# Patient Record
Sex: FEMALE
[De-identification: names, ages, dates, MRNs, and addresses within clinical notes are randomized per-mention and may not be internally consistent; named-entity substitution may affect disease eponyms.]

---

## 2018-04-29 ENCOUNTER — HOSPITAL ENCOUNTER (OUTPATIENT)
Dept: HOSPITAL 68 - ERH | Age: 58
Setting detail: OBSERVATION
LOS: 2 days | End: 2018-05-01
Attending: SURGERY | Admitting: SURGERY
Payer: COMMERCIAL

## 2018-04-29 VITALS — BODY MASS INDEX: 35.14 KG/M2 | HEIGHT: 60 IN | WEIGHT: 179 LBS

## 2018-04-29 DIAGNOSIS — E66.9: ICD-10-CM

## 2018-04-29 DIAGNOSIS — K59.00: ICD-10-CM

## 2018-04-29 DIAGNOSIS — E03.9: ICD-10-CM

## 2018-04-29 DIAGNOSIS — K80.10: Primary | ICD-10-CM

## 2018-04-29 LAB
ABSOLUTE GRANULOCYTE CT: 9.1 /CUMM (ref 1.4–6.5)
BASOPHILS # BLD: 0 /CUMM (ref 0–0.2)
BASOPHILS NFR BLD: 0.3 % (ref 0–2)
EOSINOPHIL # BLD: 0.1 /CUMM (ref 0–0.7)
EOSINOPHIL NFR BLD: 0.8 % (ref 0–5)
ERYTHROCYTE [DISTWIDTH] IN BLOOD BY AUTOMATED COUNT: 13 % (ref 11.5–14.5)
GRANULOCYTES NFR BLD: 73.2 % (ref 42.2–75.2)
HCT VFR BLD CALC: 42.9 % (ref 37–47)
LYMPHOCYTES # BLD: 2.3 /CUMM (ref 1.2–3.4)
MCH RBC QN AUTO: 30.6 PG (ref 27–31)
MCHC RBC AUTO-ENTMCNC: 33.1 G/DL (ref 33–37)
MCV RBC AUTO: 92.6 FL (ref 81–99)
MONOCYTES # BLD: 0.9 /CUMM (ref 0.1–0.6)
PLATELET # BLD: 236 /CUMM (ref 130–400)
PMV BLD AUTO: 8.4 FL (ref 7.4–10.4)
RED BLOOD CELL CT: 4.63 /CUMM (ref 4.2–5.4)
WBC # BLD AUTO: 12.4 /CUMM (ref 4.8–10.8)

## 2018-04-29 PROCEDURE — G0378 HOSPITAL OBSERVATION PER HR: HCPCS

## 2018-04-29 NOTE — CT SCAN REPORT
EXAMINATION:
CT ABDOMEN AND PELVIS WITH CONTRAST
 
CLINICAL INFORMATION:
Generalized abdominal pain.
 
COMPARISON:
None available.
 
TECHNIQUE:
Multidetector volumetric imaging was performed of the abdomen and pelvis
following IV administration of 65 mL of Omnipaque 300 intravenous contrast.
Sagittal and coronal reformatted images were obtained on the technologist's
workstation.
 
DLP:
536.08 mGy-cm
 
FINDINGS:
 
LUNG BASES: Mild hypoventilatory changes of the partially visualized lower
lungs.
 
LIVER, GALLBLADDER, AND BILIARY TREE: The liver is normal in size, shape, and
attenuation. No focal hepatic lesion or biliary ductal dilatation is present.
The gallbladder is mildly distended. No radiopaque stones. No gallbladder
wall thickening. Minimal pericholecystic stranding of the adjacent fat.
Common bile duct is normal.
 
PANCREAS: Unremarkable.
 
SPLEEN: Unremarkable.
 
ADRENAL GLANDS: Unremarkable.
 
KIDNEYS AND URETERS: The kidneys are normal in size, shape, and attenuation.
No hydronephrosis, hydroureter, or calculi seen. No perinephric stranding.
There are 2, tiny hypodensities in the left kidney is too small to
characterize but most likely represents cysts.
 
BLADDER: Unremarkable.
 
GASTROINTESTINAL TRACT: The small and large bowel are unremarkable. The
appendix is unremarkable.
 
ABDOMINAL WALL: No significant hernia is appreciated.
 
LYMPH NODES: Normal.
 
VASCULAR: Unremarkable.
 
PELVIC VISCERA: Unremarkable.
 
OSSEOUS STRUCTURES: Unremarkable.
 
IMPRESSION:
Mildly distended gallbladder with possible minimal pericholecystic
inflammatory stranding of the adjacent fat. This apparent stranding could be
artifactual. Consider right upper quadrant ultrasound for further evaluation.
Alternatively, if there is concern for acute cholecystitis, HIDA scan could
also be obtained.
 
No radiopaque gallstones. Common bile duct is not dilated.
 
Otherwise unremarkable CT of the abdomen and pelvis.

## 2018-04-29 NOTE — ED GI/GU/ABDOMINAL COMPLAINT
History of Present Illness
 
General
Chief Complaint: Abdominal Pain/Flank Pain
Stated Complaint: "ABD PAIN"
Source: patient, family
Exam Limitations: language barrier
Allergies
Coded Allergies:
NO KNOWN ALLERGIES (10/22/15)
 
Triage Note:
PT FROM HOME C/O MID ABD PAIN SINCE 18 AT
 2130. PT STATES SHE DRANK A DETOX TEA AND SINCE
 THEN HAS HAD CONSTANT BURNING SENSATION IN HER MID
 ABD THAT IS NON RADIATING. PT STATES SHE HAS NOT
 HAD A BOWEL MOVEMENT IN THE PAST 24 HRS. 18
 PT HAD A COLONOSCOPY. PT STATES TAKING TUMS
 WITHOUT RELIEF.
Triage Nurses Notes Reviewed? yes
Pregnant? n
Is pt currently breastfeeding? No
Onset: Abrupt
Duration: day(s):
Timing: recent history
Quality/Severity: moderate
Location: generalized abdomen
Radiation: no radiation
Activities at Onset: none
HPI:
57-year-old female comes into the emergency room for further evaluation of 
generalized abdominal pain nausea and 1 episode of vomiting here in the 
emergency room.  Symptoms began yesterday around 9 PM it got progressively 
worse.  Denies any prior abdominal surgeries.  Last bowel movement yesterday.  
Denies any urinary symptoms.  Denies any chest pain or shortness of breath.  She
comes in for further evaluation.
(Umesh ROSSI,Balaji)
 
Vital Signs & Intake/Output
Vital Signs & Intake/Output
 Vital Signs
 
 
Date Time Temp Pulse Resp B/P B/P Pulse O2 O2 Flow FiO2
 
     Mean Ox Delivery Rate 
 
 0620 98.4 87 18 106/58  91 Room Air  
 
 
 ED Intake and Output
 
 
 / 0000 05/ 1200
 
Intake Total  1060
 
Output Total  1250
 
Balance  -190
 
   
 
Intake, IV  1000
 
Intake, Oral  60
 
Output, Urine  1250
 
 
 
(Beverly ROSSI,Cherrie Garcia)
Reconcile Medications
Calcium Gluconate 500 MG TAB   1 TAB PO BID HEALTH SUPPLEMENT
Liothyronine Sodium (Cytomel) 25 MCG TABLET   1 TAB PO DAILY thyroid  (Reported)
Oxycodone HCl/Acetaminophen (Percocet 5-325 MG Tablet) 5 MG-325 MG TABLET   1-2 
TAB PO Q4-6 PRN PAIN
 
(Francisca SHI,Lauri MCKAY)
 
Past History
 
Travel History
Traveled to [place] past 21 day No
 
Medical History
Any Pertinent Medical History? see below for history
Endocrine: hyperthyroidism
History of MRSA: No
History of VRE: No
History of CDIFF: No
 
Surgical History
Surgical History: hysterectomy
 
Psychosocial History
Who do you live with Patient/Self
What is your primary language Amharic
Tobacco Use: Never used
 
Family History
Hx Contributory? No
(Balaji Lay)
 
Review of Systems
 
Review of Systems
Constitutional:
Reports: no symptoms. 
EENTM:
Reports: no symptoms. 
Respiratory:
Reports: no symptoms. 
Cardiovascular:
Reports: no symptoms. 
GI:
Reports: see HPI. 
Genitourinary:
Reports: no symptoms. 
Musculoskeletal:
Reports: no symptoms. 
Skin:
Reports: no symptoms. 
Neurological/Psychological:
Reports: no symptoms. 
Hematologic/Endocrine:
Reports: no symptoms. 
Immunologic/Allergic:
Reports: no symptoms. 
All Other Systems: Reviewed and Negative
(Balaji Lay)
 
Physical Exam
 
Physical Exam
General Appearance: well developed/nourished, alert, awake
Head: atraumatic
Eyes:
Bilateral: normal appearance. 
Ears, Nose, Throat, Mouth: hearing grossly normal, moist mucous membrane
Neck: normal inspection
Respiratory: normal breath sounds, no respiratory distress
Cardiovascular: regular rate/rhythm
Gastrointestinal: soft, tenderness
Back: normal inspection
Extremities: normal range of motion
Neurologic/Psych: awake, alert, oriented x 3
Skin: intact, normal color
(Balaji Lay)
 
Core Measures
ACS in differential dx? No
Sepsis Present: No
Sepsis Focused Exam Completed? No
(Cherrie Stark)
 
Progress
Differential Diagnosis: AMI, appendicitis, biliary colic, bowel obstruction, 
cholecystitis, diverticulitis, gastritis, ischemic bowel, pancreatitis, PID/
cervicitis, PUD/GERD, perforated viscous, SBO, UTI/pyelo
Diagnostic Imaging:
Viewed by Me: CT Scan.  Discussed w/RAD: CT Scan. 
Initial ED EKG: normal sinus rhythm, rate (57)
Hand-Off
   Endorsed To:
Cherrie Stark
(Balaji Lay)
Plan of Care:
 Orders
 
 
Procedure Date/time Status
 
Wound Care/Dressing  0310 Active
 
Discharge Patient   UNK Active
 
OXYGEN SETUP CHG   UNK Complete
 
OXYGEN   UNK Complete
 
 
 Laboratory Tests
 
 
18 0650:
Anion Gap 12, Estimated GFR > 60, BUN/Creatinine Ratio 13.3, Total Bilirubin 0.5
, Direct Bilirubin 0.4, AST 99  H,   H, Alkaline Phosphatase 107, Total 
Protein 6.1  L, Albumin 3.3  L
 
Radiology Impression: PATIENT: PAULINE CAO  MEDICAL RECORD NO: [id number] 
PRESENT AGE: 57  PATIENT ACCOUNT NO: [id number] : 61  LOCATION: [facility] 
ORDERING PHYSICIAN: Balaji ROSSI     SERVICE DATE:  EXAM TYPE
: CAT - CT ABD & PELVIS W IV CONTRAST EXAMINATION: CT ABDOMEN AND PELVIS WITH 
CONTRAST CLINICAL INFORMATION: Generalized abdominal pain. COMPARISON: None 
available. TECHNIQUE: Multidetector volumetric imaging was performed of the 
abdomen and pelvis following IV administration of 65 mL of Omnipaque 300 
intravenous contrast. Sagittal and coronal reformatted images were obtained on 
the technologist's workstation. DLP: 536.08 mGy-cm FINDINGS: LUNG BASES: Mild 
hypoventilatory changes of the partially visualized lower lungs. LIVER, 
GALLBLADDER, AND BILIARY TREE: The liver is normal in size, shape, and 
attenuation. No focal hepatic lesion or biliary ductal dilatation is present. 
The gallbladder is mildly distended. No radiopaque stones. No gallbladder wall 
thickening. Minimal pericholecystic stranding of the adjacent fat. Common bile 
duct is normal. PANCREAS: Unremarkable. SPLEEN: Unremarkable. ADRENAL GLANDS: 
Unremarkable. KIDNEYS AND URETERS: The kidneys are normal in size, shape, and 
attenuation. No hydronephrosis, hydroureter, or calculi seen. No perinephric 
stranding. There are 2, tiny hypodensities in the left kidney is too small to 
characterize but most likely represents cysts. BLADDER: Unremarkable. 
GASTROINTESTINAL TRACT: The small and large bowel are unremarkable. The appendix
is unremarkable. ABDOMINAL WALL: No significant hernia is appreciated. LYMPH 
NODES: Normal. VASCULAR: Unremarkable. PELVIC VISCERA: Unremarkable. OSSEOUS 
STRUCTURES: Unremarkable. IMPRESSION: Mildly distended gallbladder with possible
minimal pericholecystic inflammatory stranding of the adjacent fat. This 
apparent stranding could be artifactual. Consider right upper quadrant 
ultrasound for further evaluation. Alternatively, if there is concern for acute 
cholecystitis, HIDA scan could also be obtained. No radiopaque gallstones. 
Common bile duct is not dilated. Otherwise unremarkable CT of the abdomen and 
pelvis. DICTATED BY: Paddy Mcclain MD  DATE/TIME DICTATED:18 
:RAD.BRAVO  DATE/TIME TRANSCRIBED:18 CONFIDENTIAL, 
DO NOT COPY WITHOUT APPROPRIATE AUTHORIZATION.  <Electronically signed in Other 
Vendor System>                                                                  
                     SIGNED BY: Paddy Mcclain MD 18
Comments:
CT scan shows mild distention of gallbladder with minimal surrounding fat 
stranding. Patient reports improvement in her pain. I examined the patient's 
abdomen, she has RUQ tenderness at this time. Lactic acid is elevated. Awaiting 
general surgery consult.
 
11:25 PM - spoke with Channing Reynoso MD regarding this patient.  Surgical PA 
to evaluate the patient.
 
11:55 PM - following evaluation by surgical PA patient to be surgical 
observation for gallbladder ultrasound tomorrow and futher surgical evlauation.
(Beverly ROSSI,Cherrie Garcia)
 
Departure
 
Departure
Condition: Stable
Referrals:
Rocío SHI,Rayna LEIGH (PCP/Family)
 
Departure Forms:
Customer Survey
General Discharge Information
(Balaji Lay)
 
Departure
Disposition: STILL A PATIENT
Clinical Impression
Primary Impression: Abdominal pain
Qualifiers:  Abdominal location: right upper quadrant Qualified Code: R10.11 - 
Right upper quadrant pain
Secondary Impressions: Gallbladder dilatation
 
Observation Note
Spoke With:
Angeles SHI,Channing CHEW
Physician Advisor Notified: MARY WALKER DO
Place Patient In: Non-ED OBS Care Area
Rationale for Observation:
My rational for observation is as follows [persistent abdominal pain with CAT 
scan showing gallbladder dilation and possible surrounding inflammation 
requiring gallbladder ultrasound, Gen. surgery consult, IV fluids, premature 
discharge medically unsafe].
 
(Beverly ROSSI,Cherrie Garcia)
 
Departure
Prescriptions:
Current Visit Scripts
Oxycodone HCl/Acetaminophen (Percocet 5-325 MG Tablet) 1-2 TAB PO Q4-6 PRN PAIN 
     #36 TAB 
 
 
 
PA/NP Co-Sign Statement
Statement:
ED Attending supervision documentation-
 
[] I saw and evaluated the patient. I have also reviewed all the pertinent lab 
results and diagnostic results. I agree with the findings and the plan of care 
as documented in the PA's/NP's documentation. 
 
[x] I have reviewed the ED Record and agree with the PA's/NP's documentation.
 
[] Additions or exceptions (if any) to the PAs/NP's note and plan are 
summarized below:
[]
 
(Francisca SHI,Lauri MCKAY)
 
 
(Morphine)     
 
Morphine Sulfate 4 MG Q4P PRN  0100 AC 
 
(MORPHINE SULFATE)     
 
Ondansetron HCl 4 MG Q6P PRN  0100 AC 
 
(Zofran)     
 
Acetaminophen 1,000 MG Q6P PRN  0030 AC 
 
(Ofirmev)     
 
N/A 1 UNIT    
 
(No Carrier)     
 
Heparin Sodium  5,000 UNIT Q8  0014 AC 
 
(Porcine)     1356
 
 
 Laboratory Tests
 
 
 
18 0800:
Anion Gap 10, Estimated GFR > 60, BUN/Creatinine Ratio 18.3, Total Bilirubin 0.4
, Direct Bilirubin 0.2, AST 32, ALT 54  H, Alkaline Phosphatase 92, Total 
Protein 5.7  L, Albumin 3.1  L, Lipase 55, PT 12.2, INR 1.12, APTT 27, CBC w 
Diff NO MAN DIFF REQ, RBC 3.78  L, MCV 92.9, MCH 32.0  H, MCHC 34.4, RDW 13.2, 
MPV 8.8, Gran % 63.6, Lymphocytes % 28.0, Monocytes % 6.8, Eosinophils % 1.3, 
Basophils % 0.3, Absolute Granulocytes 4.5, Absolute Lymphocytes 2.0, Absolute 
Monocytes 0.5, Absolute Eosinophils 0.1, Absolute Basophils 0
 
18 2330:
Lactic Acid 1.4
 
18 2245:
Urine Color YEL, Urine Clarity CLEAR, Urine pH 6.5, Ur Specific Gravity 1.015, 
Urine Protein NEG, Urine Ketones NEG, Urine Nitrite NEG, Urine Bilirubin NEG, 
Urine Urobilinogen 0.2, Ur Leukocyte Esterase NEG, Ur Microscopic SEDIMENT 
EXAMINED, Urine RBC RARE, Urine WBC 1-3  H, Ur Epithelial Cells FEW, Urine 
Bacteria RARE  H, Urine Mucus FEW, Urine Hemoglobin SMALL  H, Urine Glucose NEG
 
18:
Anion Gap 14, Estimated GFR > 60, BUN/Creatinine Ratio 16.7, Glucose 119  H, 
Lactic Acid 2.5  H, Calcium 9.5, Total Bilirubin 0.5, Direct Bilirubin 0.3, AST 
44  H, ALT 55  H, Alkaline Phosphatase 116, Troponin I < 0.01, Total Protein 7.5
, Albumin 4.4, Amylase 55, Lipase 89, CBC w Diff NO MAN DIFF REQ, RBC 4.63, MCV 
92.6, MCH 30.6, MCHC 33.1, RDW 13.0, MPV 8.4, Gran % 73.2, Lymphocytes % 18.4  L
, Monocytes % 7.3, Eosinophils % 0.8, Basophils % 0.3, Absolute Granulocytes 9.1
 H, Absolute Lymphocytes 2.3, Absolute Monocytes 0.9  H, Absolute Eosinophils 
0.1, Absolute Basophils 0
 
18:
Lactic Acid Cancelled
 
Radiology Impression: PATIENT: PAULINE CAO  MEDICAL RECORD NO: [id number] 
PRESENT AGE: 57  PATIENT ACCOUNT NO: [id number] : 62  LOCATION: [facility] 
ORDERING PHYSICIAN: Balaji ROSSI     SERVICE DATE:  EXAM TYPE
: CAT - CT ABD & PELVIS W IV CONTRAST EXAMINATION: CT ABDOMEN AND PELVIS WITH 
CONTRAST CLINICAL INFORMATION: Generalized abdominal pain. COMPARISON: None 
available. TECHNIQUE: Multidetector volumetric imaging was performed of the 
abdomen and pelvis following IV administration of 65 mL of Omnipaque 300 
intravenous contrast. Sagittal and coronal reformatted images were obtained on 
the technologist's workstation. DLP: 536.08 mGy-cm FINDINGS: LUNG BASES: Mild 
hypoventilatory changes of the partially visualized lower lungs. LIVER, 
GALLBLADDER, AND BILIARY TREE: The liver is normal in size, shape, and 
attenuation. No focal hepatic lesion or biliary ductal dilatation is present. 
The gallbladder is mildly distended. No radiopaque stones. No gallbladder wall 
thickening. Minimal pericholecystic stranding of the adjacent fat. Common bile 
duct is normal. PANCREAS: Unremarkable. SPLEEN: Unremarkable. ADRENAL GLANDS: 
Unremarkable. KIDNEYS AND URETERS: The kidneys are normal in size, shape, and 
attenuation. No hydronephrosis, hydroureter, or calculi seen. No perinephric 
stranding. There are 2, tiny hypodensities in the left kidney is too small to 
characterize but most likely represents cysts. BLADDER: Unremarkable. 
GASTROINTESTINAL TRACT: The small and large bowel are unremarkable. The appendix
is unremarkable. ABDOMINAL WALL: No significant hernia is appreciated. LYMPH 
NODES: Normal. VASCULAR: Unremarkable. PELVIC VISCERA: Unremarkable. OSSEOUS 
STRUCTURES: Unremarkable. IMPRESSION: Mildly distended gallbladder with possible
minimal pericholecystic inflammatory stranding of the adjacent fat. This 
apparent stranding could be artifactual. Consider right upper quadrant 
ultrasound for further evaluation. Alternatively, if there is concern for acute 
cholecystitis, HIDA scan could also be obtained. No radiopaque gallstones. 
Common bile duct is not dilated. Otherwise unremarkable CT of the abdomen and 
pelvis. DICTATED BY: Paddy Mcclain MD  DATE/TIME DICTATED:18 
:RAD.BRAVO  DATE/TIME TRANSCRIBED:18 CONFIDENTIAL, 
DO NOT COPY WITHOUT APPROPRIATE AUTHORIZATION.  <Electronically signed in Other 
Vendor System>                                                                  
                     SIGNED BY: Paddy Mcclain MD 18
Comments:
CT scan shows mild distention of gallbladder with minimal surrounding fat 
stranding. Patient reports improvement in her pain. I examined the patient's 
abdomen, she has RUQ tenderness at this time. Lactic acid is elevated. Awaiting 
general surgery consult.
 
11:25 PM - spoke with Channing Reynoso MD regarding this patient.  Surgical PA 
to evaluate the patient.
 
11:55 PM - following evaluation by surgical PA patient to be surgical 
observation for gallbladder ultrasound tomorrow and futher surgical evlauation.
(Beverly ROSSI,Cherrie Garcia)
 
Departure
 
Departure
Condition: Stable
Referrals:
Rocío SHI,Rayna LEIGH (PCP/Family)
 
Departure Forms:
Customer Survey
General Discharge Information
(Balaji Lay)
 
Departure
Disposition: STILL A PATIENT
Clinical Impression
Primary Impression: Abdominal pain
Qualifiers:  Abdominal location: right upper quadrant Qualified Code: R10.11 - 
Right upper quadrant pain
Secondary Impressions: Gallbladder dilatation
 
Observation Note
Spoke With:
Angeles SHI,Channing CHEW
Physician Advisor Notified: MARY WALKER DO
Place Patient In: Non-ED OBS Care Area
Rationale for Observation:
My rational for observation is as follows [persistent abdominal pain with CAT 
scan showing gallbladder dilation and possible surrounding inflammation 
requiring gallbladder ultrasound, Gen. surgery consult, IV fluids, premature 
discharge medically unsafe].
 
(Cherrie Stark)

## 2018-04-30 VITALS — SYSTOLIC BLOOD PRESSURE: 98 MMHG | DIASTOLIC BLOOD PRESSURE: 54 MMHG

## 2018-04-30 VITALS — DIASTOLIC BLOOD PRESSURE: 62 MMHG | SYSTOLIC BLOOD PRESSURE: 102 MMHG

## 2018-04-30 VITALS — SYSTOLIC BLOOD PRESSURE: 102 MMHG | DIASTOLIC BLOOD PRESSURE: 60 MMHG

## 2018-04-30 VITALS — SYSTOLIC BLOOD PRESSURE: 126 MMHG | DIASTOLIC BLOOD PRESSURE: 80 MMHG

## 2018-04-30 LAB
ABSOLUTE GRANULOCYTE CT: 4.5 /CUMM (ref 1.4–6.5)
APTT BLD: 27 SEC (ref 25–37)
BASOPHILS # BLD: 0 /CUMM (ref 0–0.2)
BASOPHILS NFR BLD: 0.3 % (ref 0–2)
EOSINOPHIL # BLD: 0.1 /CUMM (ref 0–0.7)
EOSINOPHIL NFR BLD: 1.3 % (ref 0–5)
ERYTHROCYTE [DISTWIDTH] IN BLOOD BY AUTOMATED COUNT: 13.2 % (ref 11.5–14.5)
GRANULOCYTES NFR BLD: 63.6 % (ref 42.2–75.2)
HCT VFR BLD CALC: 35.1 % (ref 37–47)
LYMPHOCYTES # BLD: 2 /CUMM (ref 1.2–3.4)
MCH RBC QN AUTO: 32 PG (ref 27–31)
MCHC RBC AUTO-ENTMCNC: 34.4 G/DL (ref 33–37)
MCV RBC AUTO: 92.9 FL (ref 81–99)
MONOCYTES # BLD: 0.5 /CUMM (ref 0.1–0.6)
PLATELET # BLD: 172 /CUMM (ref 130–400)
PMV BLD AUTO: 8.8 FL (ref 7.4–10.4)
PROTHROMBIN TIME: 12.2 SEC (ref 9.4–12.5)
RED BLOOD CELL CT: 3.78 /CUMM (ref 4.2–5.4)
WBC # BLD AUTO: 7.2 /CUMM (ref 4.8–10.8)

## 2018-04-30 NOTE — OPERATIVE REPORT
Operative/Inv Procedure Report
Surgery Date: 04/30/18
Name of Procedure:
Laparoscopic cholecystectomy
Pre-Operative Diagnosis:
Acute cholecystitis
Post-Operative Diagnosis:
same
Estimated Blood Loss: scant
Surgeon/Assistant:
MD Deejay Shepard MD 
 
Anesthesia: general endotracheal tube
 
Operative/Procedure Note
Note:
Patient was positioned supine. After successful induction of general anesthesia,
the patient's abdomen was clipped, prepped and draped in the usual sterile 
fashion. Local anesthetic was injected at the top of the umbilicus and then a 
curved horizontal incision little over a centimeter was made there with a 15 
blade and then deepened to the midline fascia which was incised vertically a 
little over a centimeter. Both sides were secured with 0 Vicryl stay sutures and
then the thin peritoneal layer was entered, 10 mm Kern trocar inserted 
obliquely to the right, and the gas was turned on to 15 mm. After insufflation 
and repositioning to reverse Trendelenburg, 3 more dissecting 5 mm trochars were
placed in the right subcostal area, first lateral, then mid-subcostal, then 
subxiphoid. The gallbladder was inflamed acutely and the omentum was completely 
covering it and adherent it took some time to uncover 1 omental vessel needed a 
clip to control it then fundus was grasped from the lateral port and retracted 
up over the edge of the liver and then we dissected out the area of the triangle
of Calot while retracting the infundibulum caudally / laterally.  First the 
cystic duct was identified, isolated at the neck, clipped 3 times, divided after
the second clip and then in similar fashion the cystic artery was identified 
medially, dissected and divided. Then the gallbladder was  from the 
liver bed using cautery, this case there were more vessels to the gallbladder 
and the liver bed, once  it was then lowered into an Endobag and 
removed through the umbilical incision.  The instruments and then the trochars 
were removed letting the gas escape.  The fascial incision was closed with a 
figure 8 Vicryl then all 4 skin incisions were closed with interrupted 
subcuticular 4-0 Monocryl, followed by Mastisol Steri-Strips and Bandaids.  
Estimated blood loss was minimal, lap and sponge counts were correct, wound 
expectancy was clean-contaminated, IV fluids crystalloid, complications none, 
patient tolerated the procedure well and was returned to the recovery room in 
satisfactory condition.

## 2018-04-30 NOTE — PN- GENERAL SURGERY
Subjective
Subjective:
Post op check
Awake, alert
Tolerating regular diet
Pain is well controlled at this time
 
Objective
Vital Signs and I&Os
Vital Signs
 
 
Date Time Temp Pulse Resp B/P B/P Pulse O2 O2 Flow FiO2
 
     Mean Ox Delivery Rate 
 
04/30 1352 98.1 67 20 126/80  95 Room Air  
 
04/30 0601 98.0 66 20 102/62  93 Room Air  
 
04/30 0210 97.9 64 20 98/54  93 Room Air  
 
04/30 0137 98.8 64 18 148/70  99   
 
04/29 2238 98.7 68 18 132/58  96 Room Air  
 
 
 Intake & Output
 
 
 04/30 1600 04/30 0800 04/30 0000 04/29 1600 04/29 0800 04/29 0000
 
Intake Total 2030 2850    
 
Output Total      
 
Balance 2030 2850    
 
       
 
Intake, IV 2000 2850    
 
Intake, Oral 30     
 
Patient  179 lb 180 lb   
 
Weight      
 
Weight   Reported by Patient   
 
Measurement      
 
Method      
 
 
 
Physical Exam:
vss, afebrile
 
General: alert and oriented times three
Chest; clear anteriorly bilaterally, RRR
Abd: soft, good bs
Ext: warm, no edema
Wd: dressed, dry
 
Assessment/Plan
Assessment/Plan
56yo female s/p lap stephen
 
regular diet
pain management
dc planning
 
 
Core Measures
 
Venous Thromboembolism
VTE Risk Factors Surgery
No Mechanical VTE Prophylaxis d/t N/A MechProphylax Ordered
No VTE Pharm Prophylaxis d/t NA PharmProphylax ordered

## 2018-04-30 NOTE — ULTRASOUND REPORT
EXAMINATION:
US ABDOMEN LIMITED
 
CLINICAL INFORMATION:
Right upper quadrant pain. Presumptive diagnosis of cholecystitis.
 
COMPARISON:
CT scan of the abdomen and pelvis dated 04/29/2018.
 
TECHNIQUE:
Real-time imaging of the right upper quadrant abdominal viscera.
 
FINDINGS:
 
PANCREAS: Only small portions of the pancreatic body are visualized and
appear unremarkable. Remainder of the pancreas is obscured by overlying bowel
gas.
 
LIVER: The liver demonstrates normal size and contour. There is diffuse
increased in hepatic parenchymal echogenicity, limiting sound beam
penetration, consistent with diffuse infiltrative disease, most likely
extensive hepatic steatosis. No focal lesion or intrahepatic biliary duct
dilatation.
 
GALLBLADDER: There is a 2.0 x 1.1 x 2.2 cm densely calcified shadowing
gallstone seen lodged within the gallbladder neck. Mild thickening and edema
of the gallbladder wall is seen with the gallbladder wall measuring up to 0.6
cm in thickness. Small amount of pericholecystic fluid is also seen. No
sonographic Whitley sign is elicited while scanning over the gallbladder.
 
COMMON BILE DUCT: Normal in caliber measuring 0.4 cm in diameter.
 
RIGHT KIDNEY: Normal. No hydronephrosis. No renal calculi or focal
parenchymal lesions. The kidney measures 11.5 cm in maximum dimension.
 
FREE FLUID: None.
 
IMPRESSION:
 
1. Abnormal appearance of the gallbladder with gallbladder wall thickening
and pericholecystic edema seen. Findings are nonspecific in the setting of
diffuse hepatic parenchymal disease and may simply reflect secondary findings
of hepatic dysfunction. However, acute cholecystitis cannot be excluded.
There is a gallstone seen dependently lodged within the gallbladder neck.
Close clinical correlation is requested. HIDA scan, as suggested previously,
may be helpful in further clarification of imaging findings.
2. Diffusely increased hepatic parenchymal echogenicity, consistent with
diffuse infiltrative disease, most likely extensive hepatic steatosis. Close
clinical correlation requested.
3. Incomplete view of the pancreas. Only small portions of the pancreatic
body are seen and are unremarkable.

## 2018-04-30 NOTE — CONS- GENERAL SURGERY
General Information and HPI
 
Consulting Request
Date of Consult: 04/30/18
Requested By:
Dr. Christine
 
Reason for Consult:
abdominal pain
Source of Information: patient, family
Exam Limitations: no limitations
History of Present Illness:
A 56 y/o female arrives to the ER complaining of epigastric pain and right upper
quadrant pain that is been persistent for more than 24 hours.  Patient states 24
-hour ago the pain started after she drank some herbal tea she started to feel 
some discomfort in the epigastrium and that radiated to the right upper quadrant
and into her back.  She associated nausea and 1 episode of vomiting.  The pain 
has become progressively worse despite conservative measures at home.  She 
decided to come to the emergency room for further evaluation.  While in the ER 
her vital signs have remained stable she is been given IV Tylenol which helped 
initially however the pain has returned.  She denies any fevers or chills no 
chest pain no shortness of breath no cough or congestion previous abdominal 
surgeries she does have some constipation
 
Allergies/Medications
Allergies:
Coded Allergies:
NO KNOWN ALLERGIES (10/22/15)
 
Home Med List:
Calcium Gluconate 500 MG TAB   1 TAB PO BID HEALTH SUPPLEMENT
Liothyronine (Cytomel 25 Mcg) 25 MCG TAB   1 TAB PO BID THYROID HEALTH
OXYCODONE HCL/ACETAMINOPHEN (Percocet 5-325 MG Tablet) 325 MG/5 MG TAB   1-2 TAB
PO Q4P PRN PAIN SCALE 1-4
 
Current Medications:
 Current Medications
 
 
  Sig/Lorie Start time  Last
 
Medication Dose Route Stop Time Status Admin
 
Acetaminophen 1,000 MG Q6P PRN 04/30 0030 UNVr 
 
N/A 1 UNIT IV   
 
Acetaminophen 0 .STK-MED ONE 04/29 2026 DC 
 
  IV   
 
Acetaminophen 1,000 MG ONCE ONE 04/29 2015 DC 04/29
 
N/A 1 UNIT IV 04/29 2029 2037
 
Ampicillin Sodium/ 3,000 MG Q6 04/30 0100 UNVr 
 
Sulbactam Sodium  IV   
 
Sodium Chloride 100 ML    
 
Dextrose/Sodium  1,000 ML Q10H 04/30 0100 UNVr 
 
Chloride  IV   
 
Docusate Sodium 100 MG DAILY PRN 04/30 0100 UNVr 
 
  PO   
 
Heparin Sodium  5,000 UNIT Q8 04/30 0014 UNVr 
 
(Porcine)  SC   
 
Morphine Sulfate 2 MG Q4P PRN 04/30 0100 UNVr 
 
  IV   
 
Morphine Sulfate 4 MG Q4P PRN 04/30 0100 UNVr 
 
  IV   
 
Ondansetron HCl 4 MG Q6P PRN 04/30 0100 UNVr 
 
  IV   
 
Ondansetron HCl 0 .STK-MED ONE 04/29 2026 DC 
 
  .ROUTE   
 
Ondansetron HCl 4 MG ONCE ONE 04/29 2015 DC 04/29
 
  IV 04/29 2016 2037
 
Sodium Chloride 1,000 ML BOLUS ONE 04/29 2245 DC 04/29
 
  IV 04/29 2344  2250
 
Sodium Chloride 1,000 ML BOLUS ONE 04/29 2015 DC 04/29
 
  IV 04/29 2114 2037
 
 
 
 
Past History
 
Medical History
Blood Transfusion Hx: No
EENT: NONE (thyroidectomy)
Cardiovascular: NONE
Respiratory: NONE
Gastrointestinal: NONE
Hepatic: NONE
Endocrine: hyperthyroidism
 
Surgical History
Pertinent Surgical History: none (thyroidectomy), hysterectomy
 
Psychosocial History
Where Do You Live? Home
Who Do You Live With? spouse
Primary Language: English
Smoking Status: Never Smoked
ETOH Use: occasional use
Illicit Drug Use: denies illicit drug use
 
Functional Ability
Ambulation: independent
 
Employment History
Retired? no
 
Review of Systems
Review of Systems:
Constitutional: No chills no fever no weakness
HEENT: No blurred vision visual changes no throat pain nasal pain
CV: denies chest pain edema orthopnea syncopal episode no peripheral edema
Respiratory: No cough hemoptysis shortness of breath or wheeze
GI: pos abdominal pain no bloating pos constipation no diarrhea or bloody stools
pos vomiting
Musculoskeletal: No neck pain back pain or joint swelling
Skin: No recent acute changes in skin
Neurological: No dizziness weakness  or acute mental status changes
Hematologic: no history of blood dyscrasia to include PE DVT or bleeding 
disorder
No reported reaction to general anesthetics
No recent fevers fluids or infections
 
Review of Systems
Constitutional:
Denies: chills, weakness. 
GI:
Reports: abdominal pain, constipation, nausea, vomiting. 
 
Exam & Diagnostic Data
Vital Signs and I&O
Vital Signs
 
 
Date Time Temp Pulse Resp B/P B/P Pulse O2 O2 Flow FiO2
 
     Mean Ox Delivery Rate 
 
04/29 2238 98.7 68 18 132/58  96 Room Air  
 
04/29 1956 98.9 63 18 160/79  98 Room Air  
 
 
 Intake & Output
 
 
 04/30 0800 04/30 0000 04/29 1600 04/29 0800 04/29 0000 04/28 1600
 
Intake Total      
 
Output Total      
 
Balance      
 
       
 
Patient  180 lb    
 
Weight      
 
Weight  Reported by Patient    
 
Measurement      
 
Method      
 
 
 
Physical Exam:
Patient is alert and oriented 3 lying on the stretcher and appears comfortable.
 
HEENT is within normal limits
Neck is supple nontender with active range of motion
Chest clear to auscultation symmetric without rales rhonchi or wheeze
Heart is regular rate rhythm without murmurs rubs gallops
Abdomen is rounded and obese with mild distention tender right upper quadrant 
with positive Whitley sign of bowel sounds throughout
Lower extremities no edema no calf tenderness distal pulses intact
 
CT scan  -LIVER, GALLBLADDER, AND BILIARY TREE: The liver is normal in size, 
shape, and
attenuation. No focal hepatic lesion or biliary ductal dilatation is present.
The gallbladder is mildly distended. No radiopaque stones. No gallbladder
wall thickening. Minimal pericholecystic stranding of the adjacent fat.
Common bile duct is normal.
 
 
Admission Lab Results
I reviewed the following labs:
 Laboratory Tests
 
 
 04/29 04/29
 
 2330 2245
 
Chemistry  
 
  Lactic Acid (0.7 - 2.1 mmol/L) 1.4 
 
Urines  
 
  Urine Color (YEL,AMB,STR)  YEL
 
  Urine Clarity (CLEAR)  CLEAR
 
  Urine pH (5.0 - 8.0)  6.5
 
  Ur Specific Gravity (1.001 - 1.035)  1.015
 
  Urine Protein (NEG,<30 MG/DL)  NEG
 
  Urine Ketones (NEG)  NEG
 
  Urine Nitrite (NEG)  NEG
 
  Urine Bilirubin (NEG)  NEG
 
  Urine Urobilinogen (0.1  -  1.0 EU/dl)  0.2
 
  Ur Leukocyte Esterase (NEG)  NEG
 
  Ur Microscopic  SEDIMENT EXAMINED
 
  Urine RBC (0 - 5 /HPF)  RARE
 
  Urine WBC (0 - 2 /HPF)  1-3  H
 
  Ur Epithelial Cells (NONE,FEW)  FEW
 
  Urine Bacteria (NEG/NONE)  RARE  H
 
  Urine Mucus (FEW,NONE)  FEW
 
  Urine Hemoglobin (NEG)  SMALL  H
 
  Urine Glucose (N MG/DL)  NEG
 
 
 
 
 04/29 04/29 2035 2026
 
Chemistry  
 
  Sodium (137 - 145 mmol/L) 146  H 
 
  Potassium (3.5 - 5.1 mmol/L) 3.9 
 
  Chloride (98 - 107 mmol/L) 102 
 
  Carbon Dioxide (22 - 30 mmol/L) 30 
 
  Anion Gap (5 - 16) 14 
 
  BUN (7 - 17 mg/dL) 15 
 
  Creatinine (0.5 - 1.0 mg/dL) 0.9 
 
  Estimated GFR (>60 ml/min) > 60 
 
  BUN/Creatinine Ratio (7 - 25 %) 16.7 
 
  Glucose (65 - 99 mg/dL) 119  H 
 
  Lactic Acid (0.7 - 2.1 mmol/L) 2.5  H Cancelled
 
  Calcium (8.4 - 10.2 mg/dL) 9.5 
 
  Total Bilirubin (0.2 - 1.3 mg/dL) 0.5 
 
  Direct Bilirubin (< 0.4 mg/dL) 0.3 
 
  AST (14 - 36 U/L) 44  H 
 
  ALT (9 - 52 U/L) 55  H 
 
  Alkaline Phosphatase (<127 U/L) 116 
 
  Troponin I (< 0.11 ng/ml) < 0.01 
 
  Total Protein (6.3 - 8.2 g/dL) 7.5 
 
  Albumin (3.5 - 5.0 g/dL) 4.4 
 
  Amylase (30 - 110 U/L) 55 
 
  Lipase (23 - 300 U/L) 89 
 
Hematology  
 
  CBC w Diff NO MAN DIFF REQ 
 
  WBC (4.8 - 10.8 /CUMM) 12.4  H 
 
  RBC (4.20 - 5.40 /CUMM) 4.63 
 
  Hgb (12.0 - 16.0 G/DL) 14.2 
 
  Hct (37 - 47 %) 42.9 
 
  MCV (81.0 - 99.0 FL) 92.6 
 
  MCH (27.0 - 31.0 PG) 30.6 
 
  MCHC (33.0 - 37.0 G/DL) 33.1 
 
  RDW (11.5 - 14.5 %) 13.0 
 
  Plt Count (130 - 400 /CUMM) 236 
 
  MPV (7.4 - 10.4 FL) 8.4 
 
  Gran % (42.2 - 75.2 %) 73.2 
 
  Lymphocytes % (20.5 - 51.1 %) 18.4  L 
 
  Monocytes % (1.7 - 9.3 %) 7.3 
 
  Eosinophils % (0 - 5 %) 0.8 
 
  Basophils % (0.0 - 2.0 %) 0.3 
 
  Absolute Granulocytes (1.4 - 6.5 /CUMM) 9.1  H 
 
  Absolute Lymphocytes (1.2 - 3.4 /CUMM) 2.3 
 
  Absolute Monocytes (0.10 - 0.60 /CUMM) 0.9  H 
 
  Absolute Eosinophils (0.0 - 0.7 /CUMM) 0.1 
 
  Absolute Basophils (0.0 - 0.2 /CUMM) 0 
 
 
 
 
Assessment/Plan
Assessment/Plan
57-year-old female arrived to the emergency room complaining of right upper 
quadrant pain CAT scan performed in the ER shows inflammation of the gallbladder
with surrounding fluid.  despite given IV Tylenol the pain is persistent.  DVC 
count and lactic acid are slightly elevated enzymes are also slightly elevated
And she will be admitted to the surgical service started on IV fluids and Unasyn
and be nothing by mouth.
Morning right upper quadrant ultrasound
DVT prophylaxis will be Alps and heparin subcutaneous
 
 
 
Consult Acknowledgment
- Thank you for your consult request.

## 2018-04-30 NOTE — HISTORY & PHYSICAL PRE-OP
General Information and HPI
Source of Information: patient, family
Exam Limitations: no limitations
History of Present Illness:
CC: abdominal pain
HPI: 58 yo non-diabetic non-smoker with a history of hypothyroidism (
thyroidectomy), and constipation, little over 24 hours ago had a special tea her
mother-in-law made for and then severe abdominal pain which is a little better 
now in the ER . Earlier she recalls she had leftover takeout Chinese. Pain was 
RUQ / epigastric did not radiate, not worse with activity, some nausea initially
, no similar prior episodes no fevers no particular darkening of urine (ie iced 
tea) or lightening / loose stools (grey), no FHx of gallbladder problems. 
Otherwise no changes bowel habits, weight or appetite. I've reviewed the [facility]. 
No history of GERD, PUD, bleeding problems, heart disease or issues with 
anesthesia.  Family history negative for colorectal cancer
 
Allergies/Medications
Allergies:
Coded Allergies:
NO KNOWN ALLERGIES (10/22/15)
 
Home Med list
Calcium Gluconate 500 MG TAB   1 TAB PO BID HEALTH SUPPLEMENT
Liothyronine Sodium (Cytomel) 25 MCG TABLET   1 TAB PO DAILY thyroid  (Reported)
OXYCODONE HCL/ACETAMINOPHEN (Percocet 5-325 MG Tablet) 325 MG/5 MG TAB   1-2 TAB
PO Q4P PRN PAIN SCALE 1-4
 
 
Past History
 
Medical History
Blood Transfusion Hx: No
Neurological: NONE
EENT: NONE (thyroidectomy)
Cardiovascular: NONE
Respiratory: NONE
Gastrointestinal: NONE
Hepatic: NONE
Renal: NONE
Musculoskeletal: NONE
Psychiatric: NONE
Endocrine: hyperthyroidism
Blood Disorders: NONE
Cancer(s): NONE
GYN/Reproductive: fibroid
History of MRSA: No
History of VRE: No
History of CDIFF: No
 
Surgical History
Pertinent Surgical History: hysterectomy, N (thyroidectomy)
 
Past Family/Social History
 
Psychosocial History
Where Do You Live? Home
Who Do You Live With? spouse
Primary Language: English
Smoking Status: Never Smoked
ETOH Use: occasional use
Illicit Drug Use: denies illicit drug use
 
Functional Ability
Ambulation: independent
 
Review of Systems
Review of Systems:
Constitutional: No fever, sweats or weight loss
ENMT: No sore throat 
Cardiovascular: No chest pain, palpitations or leg swelling 
Respiratory: No shortness of breath, cough, or sputum or dyspnea on exertion
GI: No GERD or bleeding per rectum
: No dysuria or hematuria
Musculoskeletal: No new muscle weakness, bone or joint pain
Skin / Breast: No jaundice, rashes or itching
Psychiatric: No history of drug or alcohol abuse no depression or anxiety
Hematologic / lymphatic system: No problems with excessive bleeding, bruising, 
or blood clots
 
Exam & Diagnostic Data
Last 24 Hrs of Vital Signs/I&O
I reviewed Vital Signs
 
 
Date Time Temp Pulse Resp B/P B/P Pulse O2 O2 Flow FiO2
 
     Mean Ox Delivery Rate 
 
04/30 0601 98.0 66 20 102/62  93 Room Air  
 
04/30 0210 97.9 64 20 98/54  93 Room Air  
 
04/30 0137 98.8 64 18 148/70  99   
 
04/29 2238 98.7 68 18 132/58  96 Room Air  
 
04/29 1956 98.9 63 18 160/79  98 Room Air  
 
 
I reviewed Intake & Output
 
 
 04/30 1600 04/30 0800 04/30 0000
 
Intake Total  2850 
 
Output Total   
 
Balance  2850 
 
    
 
Intake, IV  2850 
 
Patient  179 lb 180 lb
 
Weight   
 
Weight   Reported by Patient
 
Measurement   
 
Method   
 
 
 
Physical Exam:
Constitutional: pleasant, no acute distress, conversant
Eyes: sclera anicteric 
ENMT: ears and nose atraumatic, moist mucous membranes, good dentition, no lip 
lesions
Neck: Supple, trachea is midline, no cervical or supraclavicular adenopathy and 
no palpable thyromegaly 
Cardiovascular: S1, S2, no murmurs, no peripheral edema 
Respiratory: clear to auscultation with normal respiratory effort and no 
intercostal retractions 
GI: abdomen soft, right upper quadrant tenderness says less than yesterday, 
nondistended, no palpable hepatosplenomegaly
Extremities / lymphatics: symmetrically warm, free range of motion no peripheral
edema, no cervical, supraclavicular, axillary, or inguinal adenopathy
Musculoskeletal: Did not evaluate gait and station, no digital cyanosis, good 
muscle strength and tone no atrophy, motor grossly 5 out of 5 throughout
Skin: no jaundice, no rashes warm, nondiaphoretic, no areas of erythema or 
induration
Psychiatric: mood and affect are appropriate and alert and oriented to person 
place and time
Last 24 Hrs of Labs/Ariel:
I reviewed Laboratory Tests
 
04/30/18 0800:
Anion Gap 10, Estimated GFR > 60, BUN/Creatinine Ratio 18.3, Total Bilirubin 0.4
, Direct Bilirubin 0.2, AST 32, ALT 54  H, Alkaline Phosphatase 92, Total 
Protein 5.7  L, Albumin 3.1  L, Lipase 55, PT 12.2, INR 1.12, APTT 27, CBC w 
Diff Pending, WBC Pending, RBC Pending, Hgb Pending, Hct Pending, MCV Pending, 
MCH Pending, MCHC Pending, RDW Pending, Plt Count Pending, MPV Pending
 
04/29/18 2330:
Lactic Acid 1.4
 
04/29/18 2245:
Urine Color YEL, Urine Clarity CLEAR, Urine pH 6.5, Ur Specific Gravity 1.015, 
Urine Protein NEG, Urine Ketones NEG, Urine Nitrite NEG, Urine Bilirubin NEG, 
Urine Urobilinogen 0.2, Ur Leukocyte Esterase NEG, Ur Microscopic SEDIMENT 
EXAMINED, Urine RBC RARE, Urine WBC 1-3  H, Ur Epithelial Cells FEW, Urine 
Bacteria RARE  H, Urine Mucus FEW, Urine Hemoglobin SMALL  H, Urine Glucose NEG
 
04/29/18 2035:
Anion Gap 14, Estimated GFR > 60, BUN/Creatinine Ratio 16.7, Glucose 119  H, 
Lactic Acid 2.5  H, Calcium 9.5, Total Bilirubin 0.5, Direct Bilirubin 0.3, AST 
44  H, ALT 55  H, Alkaline Phosphatase 116, Troponin I < 0.01, Total Protein 7.5
, Albumin 4.4, Amylase 55, Lipase 89, CBC w Diff NO MAN DIFF REQ, RBC 4.63, MCV 
92.6, MCH 30.6, MCHC 33.1, RDW 13.0, MPV 8.4, Gran % 73.2, Lymphocytes % 18.4  L
, Monocytes % 7.3, Eosinophils % 0.8, Basophils % 0.3, Absolute Granulocytes 9.1
 H, Absolute Lymphocytes 2.3, Absolute Monocytes 0.9  H, Absolute Eosinophils 
0.1, Absolute Basophils 0
 
04/29/18 2026:
Lactic Acid Cancelled
 
 
Assessment/Plan
Assessment/Plan:
Studies I reviewed yesterday's CT scan on PACS myself that shows a dilated 
slightly inflamed gallbladder, ultrasound today shows one large stone wall 
thickening pericholecystic fluid but a negative Whitley's
 
My impression is symptomatic gallstones.  The story is somewhat typical, though 
she feels better the imaging shows acute cholecystitis, so I recommend 
cholecystectomy.  The current standard of care is removal of the gallbladder 
laparoscopically, preferably not emergently.  Once they become symptomatic, 
gallstones can lead to complications such as cholecystitis, pancreatitis and 
cholangitis and rarely others. More workup is not needed but we will get a 
preoperative labs including LFTs.  I explained the nature and possibility of 
retained stones, and rarely, persistent postoperative diarrhea.  I drew a 
diagram illustrating how the stones cause problems and how the anatomy and 
inflammation can make the surgery more difficult, sometimes requiring an open 
procedure, and rarely to repair a bile duct injury leading to significant 
morbidity and even mortality.  This in our practice is exceedingly rare, but 
other more common risks were also discussed such as infection, injury to other 
surrounding structures such as bowel and blood vessels.  We also discussed the 
potential risks, benefits and alternatives to the procedure and surgery in 
general, issues that included but were not limited to, anesthetic risks 
hemorrhage requiring transfusion, the risk of transfusion itself, infection, 
heart attack, stroke, death.
 
As Ranked By This Provider
Problem List:
 1. Acute calculous cholecystitis

## 2018-04-30 NOTE — PATIENT DISCHARGE INSTRUCTIONS
Discharge Instructions
 
General Discharge Information
You were seen/treated for:
Gallstones
You had these procedures:
lap cholecystectomy
Watch for these problems:
te,p>101, increased redness or drainage of wounds
No bath, but you may shower: Yes
Other wound care:
Keep incision clean and dry.
 
Diet
Continue normal diet: Yes
 
Activity
Activity Self Limited: Yes
Pounds, do NOT lift more than: 10
 
Acute Coronary Syndrome
 
Inclusion Criteria
At DC or during hospital stay patient has or had the following:
ACS DIAGNOSIS No
 
Discharge Core Measures
Meds if any: Prescribed or Continued at Discharge
Meds if any: NOT Prescribed or Continued at Discharge
 
Congestive Heart Failure
 
Inclusion Criteria
At DC or during hospital stay patient has or had the following:
CHF DIAGNOSIS No
 
Discharge Core Measures
Meds if any: Prescribed or Continued at Discharge
Meds if any: NOT Prescribed or Continued at Discharge
 
Cerebrovascular accident
 
Inclusion Criteria
At DC or during hospital stay patient has or had the following:
CVA/TIA Diagnosis No
 
Discharge Core Measures
Meds if any: Prescribed or Continued at Discharge
Meds if any: NOT Prescribed or Continued at Discharge
 
Venous thromboembolism
 
Inclusion Criteria
VTE Diagnosis No
VTE Type NONE
VTE Confirmed by (Test) NONE
 
Discharge Core Measures
- Per Current guidelines, there needs to be overlap
- treatment for the first 5 days of Warfarin therapy.
- If discharged on Warfarin prior to 5 days of
- overlap therapy, the patient will need to be
- assessed for post discharge needs including
- *Post discharge parental anticoagulation
- *Warfarin and/or parental anticoagulation education
- *Follow up date to check INR post discharge
At least 5 days overlap therapy as Inpatient No
Meds if any: Prescribed or Continued at Discharge
Note: Overlap Therapy is Warfarin and Anticoagulant
Meds if any: NOT Prescribed or Continued at Discharge

## 2018-05-01 VITALS — DIASTOLIC BLOOD PRESSURE: 58 MMHG | SYSTOLIC BLOOD PRESSURE: 106 MMHG

## 2018-05-01 NOTE — PN- GENERAL SURGERY
Subjective
Subjective:
No acute overnight events.
Tolerating diet
No pain
Voiding
No nausea or vomitting
 
Objective
Vital Signs and I&Os
Vital Signs
 
 
Date Time Temp Pulse Resp B/P B/P Pulse O2 O2 Flow FiO2
 
     Mean Ox Delivery Rate 
 
05/01 0620 98.4 87 18 106/58  91 Room Air  
 
04/30 2015 98.0 66 20 102/60  91 Room Air  
 
04/30 1352 98.1 67 20 126/80  95 Room Air  
 
 
 Intake & Output
 
 
 05/01 1600 05/01 0800 05/01 0000 04/30 1600 04/30 0800 04/30 0000
 
Intake Total  7771 202 1261 2850 
 
Output Total  1250    
 
Balance  - 2850 
 
       
 
Intake, IV  3341 624 7860 2850 
 
Intake, Oral  60 480 30  
 
Output, Urine  1250    
 
Patient     179 lb 180 lb
 
Weight      
 
Weight      Reported by Patient
 
Measurement      
 
Method      
 
 
 
Physical Exam:
Gen:  AAO x3
Abd:  Soft, non-tender, non-distended
Drssings intact
 
Assessment/Plan
Assessment/Plan
POD 1 s/p lap stephen, progressing well
 
follow up lfts
dc to home today if labs wnl
discussed with Dr. Reynoso
 
 
Core Measures
 
Venous Thromboembolism
VTE Risk Factors Surgery
No Mechanical VTE Prophylaxis d/t N/A MechProphylax Ordered
No VTE Pharm Prophylaxis d/t NA PharmProphylax ordered